# Patient Record
(demographics unavailable — no encounter records)

---

## 2025-06-26 NOTE — HISTORY OF PRESENT ILLNESS
[FreeTextEntry1] : I had the pleasure of following up your patient at  [facility]   The patient was accompanied by: mother     BILLY BALL is a  15 year   RH presenting for concerns regarding Autoimmune encephalitis ( AIE).  BHx: FT infant, no concerns in pregnancy  Development: normal, no concerns.   This year, she developed concerns that start in the beginning of the school year.  Noted something with her hand.  Flicking movement of her hand.   No big attention was paid.  In November she had a panic attack. There was an event in school that was upsetting.  Then, we developed a difficult year in school.  There was unclear memory of these things.  Panic attacks. Pick her up for not eating. Not hungry.  There were concerns about her threatening comments.   She was speaking with a SW and psychiatrist for panic attacks.  [facility] - good care.  There were claims that she was going to hurt himself.   Safety plan from [facility] and was on medicaiton.  Then, in May another event.  She went to [facility] ED for evaluation and did not see a concern.  No danger from psychiatrist.   [facility], [place] [employer] -- finished the school year.  During this, Mycoplasma was high. "[facility] Lyme, " concerned for results.  Also recommended further testing.  Zithromycin 500 mg daily for months. It went down in month 3.   Allergies: dust mite, grasses. Pruritis and dry skin.   SW : stopped seeing  Psychiatrist: Dr Dinora Hall : new to her care.  Panic attacks at home  No panic attacks.   Headaches: She has recurrent HA. She can have mild HA 3/week .  Varies from 4-8/10 in pain. She has variable duration.  Emesis/Nausea: sometimes  Vision: no  Firework description:temporal . Menses: no relation Lights: not affected  Noise: Not affected   Currently:     Eating habits: If she has time, eats breakfast ( cereal, protein bar), Protein bar if no time. Lunch at school: Order from vendors , or pack, some combination. She can get distracted at lunch time, but gets it eventually. Eats dinner with the family.  Hydration: does bring a water bottle.  Sleep: She goes to bed at 11 pm. Sleeps at 11:30 pm Sleeps through the night. Some stuffy nose at night due to allergies. Gets up at 5:30 AM and she gets at bed. She sleeps all day on the weekends.  Sensory issues: Does a movement with the hand. No noises  Adventitial movements/events: Doesn't like the sound of other's eating.       PMH sig for:   MEDICATIONS:  Minocycline 250 bid  Zithromycin 500mg daily for  5-6 months  Ibuprofen 600 mg bid for several weeks  NAC : antihurxheimer reaction bid  Lysosomal glutathione : antihurxheimer bid  Saccromyses : probiotic   Escataliprom: 15 mg total daily  Theralac: probiotic daily  Low dose naltraxone: 1 pill for regimen  Allergies: 1 pill Circumin dailyk 2 plls immunozyme. 3 pills sun balance daily : not always    -Rescue Medications:   -Other medications:   Past Medications:   - All: NKDA   BHx: n/c  FT  Csxn complicated by:   Surg: none   FHX sig for:  MGM with anxiety   2 other brothers, ADHD diagnoses.    REVIEW OF SYSTEMS:  A 14-point review of systems was otherwise unremarkable.      PHYSICAL EXAMINATION:   Vital signs: see chart     GENERAL:   Awake, responsive,   HEAD:  Normocephalic, atraumatic.   EYES:  Conjunctiva clear, sclera non-icteric.   ENT:  Oropharynx without lesions/exudate, mucous membranes moist, lips and gums without lesion.   NECK:  No masses, supple.   RESPIRATORY:  CTA bilaterally, moving air well, breath sounds symmetric, no grunting, no flaring, no retractions.   CARDIOVASCULAR:  RRR, normal S1 and S2, no murmur.   GI:  Soft, NT, ND, normal bowel sounds.   MUSCULOSKELETAL:  No swollen or inflamed joints, full range of motion in all joints.   EXTREMITIES:  No cyanosis, no clubbing, no edema, warm and well perfused.   SKIN:  Warm and dry, normal turgor, no rash, no neurocutaneous lesions.       NEUROLOGIC EXAMINATION:   Mental Status/Language:   Full, fluent   Cranial Nerves:  PERRL, EOM intact in six cardinal directions of gaze, visual fields intact to confrontation, facial expression and sensation intact, hearing intact to finger rub bilaterally, palatal elevation symmetric with tongue protrusion in the midline, symmetric head turn and shoulder shrug.   Strength:  Full strength, normal tone, normal bulk   Reflexes:  DTR's 2+ and symmetric throughout.  Plantar response flexor bilaterally.   Coordination:  Finger to nose testing normal, no adventitial movements.   Sensation:  Intact sensation to light touch, normal proprioception.   Stance/Gait:  Normal bipedal stance, developmentally appropriate gait with normal toe, heel and tandem gait.       TESTING: Results of previous testing and Inpatient hospitalization Outpatient evaluation were reviewed in detail. Of significance:   Blood tests: reviewed the testing provided in chart. Appears sig for IGE elevation due to allergic predisposition    EEG:   AVEEG/VEEG:   MRI:   Other:   IMPRESSION:    BILLY BALL is a  15 year  old RH presenting for concern regarding autoimmune encephalitis. At this time, she is doing well . There are no signs of significant neurocognitive concerns. She has an intermittent, benign motor tic.  I am concerned about memory issues and feel that it warrants further testing  I recommend that she continue working with her mental health providers regarding anxiety.  Given how well she is now, I strongly recommend a rapid wean of ibuprofen which may be contributing to rebound headaches, and the prolonged course of antibiotics.    I reviewed the results of the previous testing with the patient and family member present. We discussed in detail the recommended evaluation documented below.       PLAN:    NEUROIMAGING: - MRI for neurocerebral markers of inflammation and for HA      NEUROPSYCH: - We will be scheduling neuropsychological testing for the determination of neurocognitive deficits in attention and executive functions.      TREATMENT:   -  Emergency medication:          None recommended    Recommended if the seizure persists for close to 5 mins.  May repeat a second dose if the seizure persists for an additional 1-2 minutes. Recommended to call 911 if seizure persists after 2 doses of emergency medication.     -  Follow up after testing.          Thank you for allowing us to participate in the care of your patient.  If you have any further questions, please call our office.